# Patient Record
Sex: MALE | URBAN - METROPOLITAN AREA
[De-identification: names, ages, dates, MRNs, and addresses within clinical notes are randomized per-mention and may not be internally consistent; named-entity substitution may affect disease eponyms.]

---

## 2019-11-21 ENCOUNTER — OFFICE VISIT (OUTPATIENT)
Dept: URBAN - METROPOLITAN AREA CLINIC 17 | Facility: CLINIC | Age: 49
End: 2019-11-21
Payer: COMMERCIAL

## 2019-11-21 DIAGNOSIS — E10.3592 TYPE 1 DIAB WITH PROLIF DIAB RTNOP WITHOUT MCLR EDEMA, L EYE: Primary | ICD-10-CM

## 2019-11-21 PROCEDURE — 92134 CPTRZ OPH DX IMG PST SGM RTA: CPT | Performed by: OPHTHALMOLOGY

## 2019-11-21 PROCEDURE — 99213 OFFICE O/P EST LOW 20 MIN: CPT | Performed by: OPHTHALMOLOGY

## 2019-11-21 PROCEDURE — 2022F DILAT RTA XM EVC RTNOPTHY: CPT | Performed by: OPHTHALMOLOGY

## 2019-11-21 RX ORDER — KETOROLAC TROMETHAMINE 5 MG/ML
0.5 % SOLUTION OPHTHALMIC
Qty: 5 | Refills: 5 | Status: ACTIVE
Start: 2019-11-21

## 2019-11-21 ASSESSMENT — INTRAOCULAR PRESSURE: OS: 13

## 2019-11-21 NOTE — IMPRESSION/PLAN
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 OS. Condition: established, stable. Vision: vision affected. Plan: Discussed diagnosis in detail with patient. Exam shows minimal Diabetic changes. No treatment is recommended at this time. Emphasized blood sugar control and advised to keep future appointments with PCP and/or Endocrinologist for the management of Diabetes. Recommend for patient to use a mild anti-inflammatory drop, Ketorolac tid-qid OS, and artificial tears at least 4x daily OS. Recommend observation for now.  OCT shows no change in OCT compared to 3/27/2018